# Patient Record
Sex: MALE | Race: WHITE
[De-identification: names, ages, dates, MRNs, and addresses within clinical notes are randomized per-mention and may not be internally consistent; named-entity substitution may affect disease eponyms.]

---

## 2018-11-21 ENCOUNTER — HOSPITAL ENCOUNTER (EMERGENCY)
Dept: HOSPITAL 72 - EMR | Age: 63
LOS: 1 days | Discharge: ANOTHER HOSPITAL | End: 2018-11-22
Payer: COMMERCIAL

## 2018-11-21 VITALS — SYSTOLIC BLOOD PRESSURE: 124 MMHG | DIASTOLIC BLOOD PRESSURE: 73 MMHG

## 2018-11-21 VITALS — WEIGHT: 125 LBS | BODY MASS INDEX: 18.51 KG/M2 | HEIGHT: 69 IN

## 2018-11-21 VITALS — SYSTOLIC BLOOD PRESSURE: 131 MMHG | DIASTOLIC BLOOD PRESSURE: 68 MMHG

## 2018-11-21 VITALS — SYSTOLIC BLOOD PRESSURE: 138 MMHG | DIASTOLIC BLOOD PRESSURE: 72 MMHG

## 2018-11-21 DIAGNOSIS — F17.200: ICD-10-CM

## 2018-11-21 DIAGNOSIS — K20.9: ICD-10-CM

## 2018-11-21 DIAGNOSIS — R07.9: Primary | ICD-10-CM

## 2018-11-21 LAB
ADD MANUAL DIFF: NO
ALBUMIN SERPL-MCNC: 2.7 G/DL (ref 3.4–5)
ALBUMIN/GLOB SERPL: 0.5 {RATIO} (ref 1–2.7)
ALP SERPL-CCNC: 145 U/L (ref 46–116)
ALT SERPL-CCNC: 28 U/L (ref 12–78)
ANION GAP SERPL CALC-SCNC: 9 MMOL/L (ref 5–15)
APPEARANCE UR: CLEAR
APTT PPP: (no result) S
AST SERPL-CCNC: 30 U/L (ref 15–37)
BASOPHILS NFR BLD AUTO: 1.9 % (ref 0–2)
BILIRUB SERPL-MCNC: 0.4 MG/DL (ref 0.2–1)
BUN SERPL-MCNC: 19 MG/DL (ref 7–18)
CALCIUM SERPL-MCNC: 9.3 MG/DL (ref 8.5–10.1)
CHLORIDE SERPL-SCNC: 98 MMOL/L (ref 98–107)
CK MB SERPL-MCNC: 4.3 NG/ML (ref 0–3.6)
CK SERPL-CCNC: 34 U/L (ref 26–308)
CO2 SERPL-SCNC: 25 MMOL/L (ref 21–32)
CREAT SERPL-MCNC: 0.9 MG/DL (ref 0.55–1.3)
EOSINOPHIL NFR BLD AUTO: 2 % (ref 0–3)
ERYTHROCYTE [DISTWIDTH] IN BLOOD BY AUTOMATED COUNT: 16.8 % (ref 11.6–14.8)
GLOBULIN SER-MCNC: 6 G/DL
GLUCOSE UR STRIP-MCNC: NEGATIVE MG/DL
HCT VFR BLD CALC: 26.8 % (ref 42–52)
HGB BLD-MCNC: 9 G/DL (ref 14.2–18)
KETONES UR QL STRIP: NEGATIVE
LEUKOCYTE ESTERASE UR QL STRIP: NEGATIVE
LYMPHOCYTES NFR BLD AUTO: 10.1 % (ref 20–45)
MCV RBC AUTO: 83 FL (ref 80–99)
MONOCYTES NFR BLD AUTO: 11.4 % (ref 1–10)
NEUTROPHILS NFR BLD AUTO: 74.6 % (ref 45–75)
NITRITE UR QL STRIP: NEGATIVE
PH UR STRIP: 7 [PH] (ref 4.5–8)
PLATELET # BLD: 203 K/UL (ref 150–450)
POTASSIUM SERPL-SCNC: 3.8 MMOL/L (ref 3.5–5.1)
PROT UR QL STRIP: NEGATIVE
RBC # BLD AUTO: 3.23 M/UL (ref 4.7–6.1)
SODIUM SERPL-SCNC: 132 MMOL/L (ref 136–145)
SP GR UR STRIP: 1.01 (ref 1–1.03)
UROBILINOGEN UR-MCNC: NORMAL MG/DL (ref 0–1)
WBC # BLD AUTO: 4.4 K/UL (ref 4.8–10.8)

## 2018-11-21 PROCEDURE — 71045 X-RAY EXAM CHEST 1 VIEW: CPT

## 2018-11-21 PROCEDURE — 96376 TX/PRO/DX INJ SAME DRUG ADON: CPT

## 2018-11-21 PROCEDURE — 82553 CREATINE MB FRACTION: CPT

## 2018-11-21 PROCEDURE — 96375 TX/PRO/DX INJ NEW DRUG ADDON: CPT

## 2018-11-21 PROCEDURE — 85730 THROMBOPLASTIN TIME PARTIAL: CPT

## 2018-11-21 PROCEDURE — 80053 COMPREHEN METABOLIC PANEL: CPT

## 2018-11-21 PROCEDURE — 85025 COMPLETE CBC W/AUTO DIFF WBC: CPT

## 2018-11-21 PROCEDURE — 36415 COLL VENOUS BLD VENIPUNCTURE: CPT

## 2018-11-21 PROCEDURE — 93005 ELECTROCARDIOGRAM TRACING: CPT

## 2018-11-21 PROCEDURE — 82550 ASSAY OF CK (CPK): CPT

## 2018-11-21 PROCEDURE — 84484 ASSAY OF TROPONIN QUANT: CPT

## 2018-11-21 PROCEDURE — 81003 URINALYSIS AUTO W/O SCOPE: CPT

## 2018-11-21 PROCEDURE — 96374 THER/PROPH/DIAG INJ IV PUSH: CPT

## 2018-11-21 PROCEDURE — 99284 EMERGENCY DEPT VISIT MOD MDM: CPT

## 2018-11-21 PROCEDURE — 71275 CT ANGIOGRAPHY CHEST: CPT

## 2018-11-21 NOTE — DIAGNOSTIC IMAGING REPORT
EXAM:

  XR Chest, 1 View

 

CLINICAL HISTORY:

  CP

 

TECHNIQUE:

  Frontal view of the chest.

 

COMPARISON:

  No relevant prior studies available.

 

FINDINGS:

  Lungs:  Bilateral pulmonary opacities.

  Pleural space:  Unremarkable.  No pneumothorax.

  Heart:  Unremarkable.  No cardiomegaly.

  Mediastinum:  Unremarkable.

  Bones/joints:  Probable old right rib fracture.

 

IMPRESSION:     

  Bilateral pulmonary opacities.  Could be from edema or pneumonia in the 

proper clinical setting. There's a broader differential.

## 2018-11-21 NOTE — DIAGNOSTIC IMAGING REPORT
EXAM:

  CT Chest With Intravenous Contrast

 

CLINICAL HISTORY:

  CP

 

TECHNIQUE:

  Axial computed tomography images of the chest with intravenous contrast.

  CTDI is 0.17 + 12.62 + 138.85 + 19.42 mGy and DLP is 810 mGy-cm.  One 

or more of the following dose reduction techniques were used: automated 

exposure control, adjustment of the mA and/or kV according to patient 

size, use of iterative reconstruction technique.

  MIP reconstructed images were created and reviewed.

 

COMPARISON:

  No relevant prior studies available.

 

FINDINGS:

  Lungs:  No central pulmonary embolus.  Pulmonary emphysema and fibrosis.

 Old bilateral pulmonary nodularity with some dominant nodules in the 

right lung.

  Pleural space:  Unremarkable.  No pneumothorax.  No effusion.

  Heart:  No cardiomegaly.  No pericardial effusion.

  Mediastinum:  Irregular thickening of the distal esophagus and GE 

junction with surrounding infiltration.  Debris in the mildly distended 

esophagus.

  Bones/joints:  Old rib fractures.  Chronic appearing compression 

deformity in the lower thoracic spine

  Soft tissues:  Unremarkable.

  Vasculature:  Unremarkable.  No thoracic aortic aneurysm.

  Lymph nodes:  No enlarged lymph nodes.

  Liver:  Nodular liver.

  Stomach and bowel:  Gastric distention.

  Intraperitoneal space:  Small amounts of fluid in the peritoneal cavity.

 

  Other findings:  Old granulomas disease.

 

IMPRESSION:     

1.  No central pulmonary embolus.

2.  Irregular thickening of the distal esophagus and GE junction with 

surrounding infiltration.  Differential includes inflammatory or 

neoplastic process.

## 2018-11-21 NOTE — EMERGENCY ROOM REPORT
History of Present Illness


General


Chief Complaint:  Chest Pain


Source:  Patient, EMS





Present Illness


HPI


This is 63-year-old male with a history esophageal cancer.  He is status post 

chemotherapy and radiation.  He presents with sternal chest pain ongoing for 

last 4 days.  Sharp in nature.  Worse with inspiration.  Better with rest.  No 

cough.  Subjective fever and chills.  Pain is 9 out of 10.  No relief with his 

morphine.  No cough.  No congestion.  No diaphoresis or exertional component.


Allergies:  


Coded Allergies:  


     No Known Allergies (Unverified , 11/21/18)





Patient History


Past Medical History:  see triage record, old chart reviewed


Past Surgical History:  other


Pertinent Family History:  none


Social History:  Reports: smoking


Immunizations:  other


Reviewed Nursing Documentation:  PMH: Agreed; PSxH: Agreed





Review of Systems


Eye:  Denies: eye pain, blurred vision


ENT:  Denies: ear pain, nose congestion, throat swelling


Respiratory:  Denies: cough, shortness of breath


Cardiovascular:  Reports: chest pain; Denies: palpitations


Gastrointestinal:  Denies: abdominal pain, diarrhea, nausea, vomiting


Musculoskeletal:  Denies: back pain, joint pain


Skin:  Denies: rash


Neurological:  Denies: headache, numbness


Endocrine:  Denies: increased thirst, increased urine


Hematologic/Lymphatic:  Denies: easy bruising


All Other Systems:  negative except mentioned in HPI





Physical Exam





Vital Signs








  Date Time  Temp Pulse Resp B/P (MAP) Pulse Ox O2 Delivery O2 Flow Rate FiO2


 


11/21/18 19:22 99.0 74 18 118/73 100 Room Air  





vitals unremarkable


Sp02 EP Interpretation:  reviewed, normal


General Appearance:  no apparent distress, alert, cachetic, Chronically Ill


Head:  normocephalic, atraumatic


Eyes:  bilateral eye PERRL, bilateral eye EOMI


ENT:  hearing grossly normal, normal pharynx


Neck:  full range of motion, supple, no meningismus


Respiratory:  chest non-tender, lungs clear, normal breath sounds, other - Pain 

with palpation of  sternum


Cardiovascular #1:  regular rate, rhythm, no murmur


Gastrointestinal:  normal bowel sounds, non tender, no mass, no organomegaly, 

no bruit, non-distended


Musculoskeletal:  back normal, gait/station normal, normal range of motion


Psychiatric:  mood/affect normal


Skin:  warm/dry





Medical Decision Making


Diagnostic Impression:  


 Primary Impression:  


 Chest pain


 Qualified Codes:  R07.9 - Chest pain, unspecified


 Additional Impression:  


 Esophagitis, acute


ER Course


Patient presents with chest pain.  This is more of an esophagitis.  Troponin 

negative.  EKG unremarkable.  Lungs show no evidence of any PE or infection.  

He does have fibrosis and emphysema.  There is irregular thickening of the 

distal esophagus with surrounding infiltration.  There is debris in the mildly 

distended esophagus.





Patient be admitted versus transfer.





I discussed the case with  who accepted pt for transfer to [facility].


Lab Results Impression


labs unremarkable


EKG Diagnostic Results


Rate:  normal


Rhythm:  NSR


ST Segments:  no acute changes





Rhythm Strip Diag. Results


Rhythm Strip Time:  19:31


EP Interpretation:  yes


Rate:  70


Rhythm:  NSR, no PVC's, no ectopy





Last Vital Signs








  Date Time  Temp Pulse Resp B/P (MAP) Pulse Ox O2 Delivery O2 Flow Rate FiO2


 


11/21/18 19:22 99.0 74 18 118/73 100 Room Air  








Status:  improved


[facility]


Condition:  Stable











Julio Holland MD Nov 21, 2018 19:31

## 2018-11-22 VITALS — SYSTOLIC BLOOD PRESSURE: 129 MMHG | DIASTOLIC BLOOD PRESSURE: 71 MMHG

## 2018-11-22 VITALS — DIASTOLIC BLOOD PRESSURE: 71 MMHG | SYSTOLIC BLOOD PRESSURE: 129 MMHG

## 2018-12-15 ENCOUNTER — HOSPITAL ENCOUNTER (EMERGENCY)
Dept: HOSPITAL 72 - EMR | Age: 63
LOS: 1 days | Discharge: ANOTHER HOSPITAL | End: 2018-12-16
Payer: COMMERCIAL

## 2018-12-15 VITALS — DIASTOLIC BLOOD PRESSURE: 74 MMHG | SYSTOLIC BLOOD PRESSURE: 139 MMHG

## 2018-12-15 VITALS — DIASTOLIC BLOOD PRESSURE: 60 MMHG | SYSTOLIC BLOOD PRESSURE: 130 MMHG

## 2018-12-15 VITALS — SYSTOLIC BLOOD PRESSURE: 117 MMHG | DIASTOLIC BLOOD PRESSURE: 79 MMHG

## 2018-12-15 VITALS — HEIGHT: 68 IN | BODY MASS INDEX: 19.7 KG/M2 | WEIGHT: 130 LBS

## 2018-12-15 DIAGNOSIS — I50.9: ICD-10-CM

## 2018-12-15 DIAGNOSIS — J44.9: ICD-10-CM

## 2018-12-15 DIAGNOSIS — G40.909: ICD-10-CM

## 2018-12-15 DIAGNOSIS — R18.8: ICD-10-CM

## 2018-12-15 DIAGNOSIS — J18.1: ICD-10-CM

## 2018-12-15 DIAGNOSIS — R10.84: Primary | ICD-10-CM

## 2018-12-15 DIAGNOSIS — K74.60: ICD-10-CM

## 2018-12-15 DIAGNOSIS — Z85.01: ICD-10-CM

## 2018-12-15 LAB
ADD MANUAL DIFF: YES
ALBUMIN SERPL-MCNC: 2.3 G/DL (ref 3.4–5)
ALBUMIN/GLOB SERPL: 0.4 {RATIO} (ref 1–2.7)
ALP SERPL-CCNC: 421 U/L (ref 46–116)
ALT SERPL-CCNC: 23 U/L (ref 12–78)
ANION GAP SERPL CALC-SCNC: 11 MMOL/L (ref 5–15)
APPEARANCE UR: CLEAR
APTT BLD: 29 SEC (ref 23–33)
APTT PPP: (no result) S
AST SERPL-CCNC: 45 U/L (ref 15–37)
BILIRUB SERPL-MCNC: 0.6 MG/DL (ref 0.2–1)
BUN SERPL-MCNC: 33 MG/DL (ref 7–18)
CALCIUM SERPL-MCNC: 9.8 MG/DL (ref 8.5–10.1)
CHLORIDE SERPL-SCNC: 101 MMOL/L (ref 98–107)
CK SERPL-CCNC: 12 U/L (ref 26–308)
CO2 SERPL-SCNC: 24 MMOL/L (ref 21–32)
CREAT SERPL-MCNC: 1.3 MG/DL (ref 0.55–1.3)
ERYTHROCYTE [DISTWIDTH] IN BLOOD BY AUTOMATED COUNT: 17.1 % (ref 11.6–14.8)
GLOBULIN SER-MCNC: 6.1 G/DL
GLUCOSE UR STRIP-MCNC: NEGATIVE MG/DL
HCT VFR BLD CALC: 27.9 % (ref 42–52)
HGB BLD-MCNC: 9 G/DL (ref 14.2–18)
INR PPP: 1.2 (ref 0.9–1.1)
KETONES UR QL STRIP: NEGATIVE
LEUKOCYTE ESTERASE UR QL STRIP: NEGATIVE
MCV RBC AUTO: 80 FL (ref 80–99)
NITRITE UR QL STRIP: NEGATIVE
PH UR STRIP: 5 [PH] (ref 4.5–8)
PLATELET # BLD: 241 K/UL (ref 150–450)
POTASSIUM SERPL-SCNC: 4 MMOL/L (ref 3.5–5.1)
PROT UR QL STRIP: (no result)
RBC # BLD AUTO: 3.49 M/UL (ref 4.7–6.1)
SODIUM SERPL-SCNC: 136 MMOL/L (ref 136–145)
SP GR UR STRIP: 1.01 (ref 1–1.03)
UROBILINOGEN UR-MCNC: NORMAL MG/DL (ref 0–1)
WBC # BLD AUTO: 9.6 K/UL (ref 4.8–10.8)

## 2018-12-15 PROCEDURE — 74177 CT ABD & PELVIS W/CONTRAST: CPT

## 2018-12-15 PROCEDURE — 87040 BLOOD CULTURE FOR BACTERIA: CPT

## 2018-12-15 PROCEDURE — 96365 THER/PROPH/DIAG IV INF INIT: CPT

## 2018-12-15 PROCEDURE — 96375 TX/PRO/DX INJ NEW DRUG ADDON: CPT

## 2018-12-15 PROCEDURE — 86850 RBC ANTIBODY SCREEN: CPT

## 2018-12-15 PROCEDURE — 86901 BLOOD TYPING SEROLOGIC RH(D): CPT

## 2018-12-15 PROCEDURE — 85610 PROTHROMBIN TIME: CPT

## 2018-12-15 PROCEDURE — 71045 X-RAY EXAM CHEST 1 VIEW: CPT

## 2018-12-15 PROCEDURE — 86900 BLOOD TYPING SEROLOGIC ABO: CPT

## 2018-12-15 PROCEDURE — 81003 URINALYSIS AUTO W/O SCOPE: CPT

## 2018-12-15 PROCEDURE — 85007 BL SMEAR W/DIFF WBC COUNT: CPT

## 2018-12-15 PROCEDURE — 36415 COLL VENOUS BLD VENIPUNCTURE: CPT

## 2018-12-15 PROCEDURE — 84484 ASSAY OF TROPONIN QUANT: CPT

## 2018-12-15 PROCEDURE — 96361 HYDRATE IV INFUSION ADD-ON: CPT

## 2018-12-15 PROCEDURE — 82550 ASSAY OF CK (CPK): CPT

## 2018-12-15 PROCEDURE — 83605 ASSAY OF LACTIC ACID: CPT

## 2018-12-15 PROCEDURE — 99285 EMERGENCY DEPT VISIT HI MDM: CPT

## 2018-12-15 PROCEDURE — 96376 TX/PRO/DX INJ SAME DRUG ADON: CPT

## 2018-12-15 PROCEDURE — 80053 COMPREHEN METABOLIC PANEL: CPT

## 2018-12-15 PROCEDURE — 85730 THROMBOPLASTIN TIME PARTIAL: CPT

## 2018-12-15 PROCEDURE — 85025 COMPLETE CBC W/AUTO DIFF WBC: CPT

## 2018-12-15 PROCEDURE — 96367 TX/PROPH/DG ADDL SEQ IV INF: CPT

## 2018-12-15 PROCEDURE — 83880 ASSAY OF NATRIURETIC PEPTIDE: CPT

## 2018-12-15 PROCEDURE — 93005 ELECTROCARDIOGRAM TRACING: CPT

## 2018-12-15 PROCEDURE — 83690 ASSAY OF LIPASE: CPT

## 2018-12-15 NOTE — EMERGENCY ROOM REPORT
History of Present Illness


General


Chief Complaint:  Abdominal Pain


Source:  Patient, EMS





Present Illness


HPI


Patient has 2 days of abdominal pain that's been worsening.  EMS was summoned.  

He has history of esophageal cancer, COPD and congestive heart failure.  Before 

EMS arrived is given Norco 5/325.  The patient states that the pain is still 

severe at this time.  He denies vomiting or diarrhea.  He's extremely 

constipated and also has not been passing any gas.  He denies any previous 

abdominal surgery.  He can't point with a finger the pain is diffuse and there 

is pressure and aching it's constant.  He denies any dysuria.  He rates the 

pain at 7/10, "severe" pressure and aching, constant and diffuse.  He states he'

s never had pain like this.  Denies dysuria.





He also has COPD and states he has been coughing.  He doesn't describe the 

phlegm.  He has HEBERT usually and this has not changed.  He denies orthopnea.





H/O seizures.  On medication and no seizures recently.





He denies chest pain, rashes, headache.


Allergies:  


Coded Allergies:  


     No Known Allergies (Unverified , 11/21/18)





Patient History


Past Medical History:  see triage record


Social History:  Denies: smoking - Prior


Social History Narrative


assisted living


Reviewed Nursing Documentation:  PMH: Agreed; PSxH: Agreed





Nursing Documentation-PMH


Hx Cardiac Problems:  Yes - CHF, SEPSIS


Hx COPD:  Yes


Hx Cancer:  Yes


Hx Neurological Problems:  Yes - seizures


Hx Seizures:  Yes





Review of Systems


All Other Systems:  negative except mentioned in HPI





Physical Exam





Vital Signs








  Date Time  Temp Pulse Resp B/P (MAP) Pulse Ox O2 Delivery O2 Flow Rate FiO2


 


12/15/18 19:10 97.5 90 16 130/60 97 Nasal Cannula 2.0 








Sp02 EP Interpretation:  reviewed, normal


General Appearance:  alert, GCS 15, non-toxic, mild distress, other - Eyes 

closed


Eyes:  bilateral eye other - eyes tight shut with pain


ENT:  moist mucus membranes


Neck:  full range of motion, supple


Respiratory:  crackles - Anteriorly, rales


Cardiovascular #1:  regular rate, rhythm, no edema


Cardiovascular #2:  2+ radial (L)


Gastrointestinal:  abnormal bowel sounds - slightly hyperactive, distended - 

but soft, guarding, rebound, tenderness - Diffuse


Genitourinary:  no CVA tenderness


Musculoskeletal:  digits/nails normal, normal range of motion, no calf 

tenderness


Neurologic:  alert, oriented x3, grossly normal


Psychiatric:  anxious - with pain


Skin:  other - sallo





Medical Decision Making


Diagnostic Impression:  


 Primary Impression:  


 Abdominal pain


 Qualified Codes:  R10.84 - Generalized abdominal pain


 Additional Impressions:  


 Pneumonia


 Qualified Codes:  J18.1 - Lobar pneumonia, unspecified organism


 Cirrhosis


 Qualified Codes:  K74.60 - Unspecified cirrhosis of liver; R18.8 - Other 

ascites


 Ascites


 Qualified Codes:  R18.8 - Other ascites


ER Course


Patient presents with severe abdominal pain is worsening over the last 2 days.  

Differential includes perforated viscus, diverticulitis, gastritis, peptic 

ulcer disease, pancreatitis amongst others.  Based on exam suspicion for a 

surgical pathology is high.  Patient will be evaluated with EKG, chest x-ray 

and CT the abdomen along with labs.  Patient will be treated with IV hydration, 

Zofran and morphine.





EKG with normal sinus rhythm rate 75 normal EKG.  Chest x-ray with bilateral 

interstitial marks consistent with interstitial edema or fibrosis.  Based on 

the x-ray the bolus is canceled but IV hydration will be continued.  CBC with 

normal WBC, anemia (same as 1 month ago).  CMP with renal insufficiency, min 

elevated glucose.  BNP mildly elevated.  Initial lactate 2.0.





CT abdomen with question of pneumonia.  Cirrhosis and ascites.





BC ordered and antibiotics.  Complex presentation. Consideration for SBP.





Improved pain.  Not surgical abdomen at this time however, consider surgical 

consultation.  Repeat lactate 1.7.





Presented to Dr. Brenner and accepted for transfer.





Repeat exam 23:05, soft but distended, no rebound, some guarding.





Laboratory Tests








Test


  12/15/18


19:40 12/15/18


21:35


 


White Blood Count


  9.6 K/UL


(4.8-10.8) 


 


 


Red Blood Count


  3.49 M/UL


(4.70-6.10)  L 


 


 


Hemoglobin


  9.0 G/DL


(14.2-18.0)  L 


 


 


Hematocrit


  27.9 %


(42.0-52.0)  L 


 


 


Mean Corpuscular Volume 80 FL (80-99)   


 


Mean Corpuscular Hemoglobin


  25.8 PG


(27.0-31.0)  L 


 


 


Mean Corpuscular Hemoglobin


Concent 32.3 G/DL


(32.0-36.0) 


 


 


Red Cell Distribution Width


  17.1 %


(11.6-14.8)  H 


 


 


Platelet Count


  241 K/UL


(150-450) 


 


 


Mean Platelet Volume


  7.0 FL


(6.5-10.1) 


 


 


Neutrophils (%) (Auto)


  % (45.0-75.0)


  


 


 


Lymphocytes (%) (Auto)


  % (20.0-45.0)


  


 


 


Monocytes (%) (Auto)  % (1.0-10.0)   


 


Eosinophils (%) (Auto)  % (0.0-3.0)   


 


Basophils (%) (Auto)  % (0.0-2.0)   


 


Differential Total Cells


Counted 100  


  


 


 


Neutrophils % (Manual) 91 % (45-75)  H 


 


Lymphocytes % (Manual) 4 % (20-45)  L 


 


Monocytes % (Manual) 3 % (1-10)   


 


Eosinophils % (Manual) 0 % (0-3)   


 


Basophils % (Manual) 0 % (0-2)   


 


Band Neutrophils 2 % (0-8)   


 


Platelet Estimate Adequate   


 


Platelet Morphology Normal   


 


Hypochromasia 1+   


 


Anisocytosis 1+   


 


Prothrombin Time


  12.1 SEC


(9.30-11.50)  H 


 


 


Prothrombin Time INR


  1.2 (0.9-1.1)


H 


 


 


PTT


  29 SEC (23-33)


  


 


 


Urine Color Pale yellow   


 


Urine Appearance Clear   


 


Urine pH 5 (4.5-8.0)   


 


Urine Specific Gravity


  1.015


(1.005-1.035) 


 


 


Urine Protein


  2+ (NEGATIVE)


H 


 


 


Urine Glucose (UA)


  Negative


(NEGATIVE) 


 


 


Urine Ketones


  Negative


(NEGATIVE) 


 


 


Urine Blood


  1+ (NEGATIVE)


H 


 


 


Urine Nitrite


  Negative


(NEGATIVE) 


 


 


Urine Bilirubin


  Negative


(NEGATIVE) 


 


 


Urine Urobilinogen


  Normal MG/DL


(0.0-1.0) 


 


 


Urine Leukocyte Esterase


  Negative


(NEGATIVE) 


 


 


Urine RBC


  2-4 /HPF (0 -


0)  H 


 


 


Urine WBC


  0-2 /HPF (0 -


0) 


 


 


Urine Squamous Epithelial


Cells None /LPF


(NONE/OCC) 


 


 


Urine Bacteria


  Few /HPF


(NONE) 


 


 


Sodium Level


  136 MMOL/L


(136-145) 


 


 


Potassium Level


  4.0 MMOL/L


(3.5-5.1) 


 


 


Chloride Level


  101 MMOL/L


() 


 


 


Carbon Dioxide Level


  24 MMOL/L


(21-32) 


 


 


Anion Gap


  11 mmol/L


(5-15) 


 


 


Blood Urea Nitrogen


  33 mg/dL


(7-18)  H 


 


 


Creatinine


  1.3 MG/DL


(0.55-1.30) 


 


 


Estimate Glomerular


Filtration Rate 55.8 mL/min


(>60) 


 


 


Glucose Level


  132 MG/DL


()  H 


 


 


Calcium Level


  9.8 MG/DL


(8.5-10.1) 


 


 


Total Bilirubin


  0.6 MG/DL


(0.2-1.0) 


 


 


Aspartate Amino Transferase


(AST) 45 U/L (15-37)


H 


 


 


Alanine Aminotransferase (ALT)


  23 U/L (12-78)


  


 


 


Alkaline Phosphatase


  421 U/L


()  H 


 


 


Total Creatine Kinase


  12 U/L


()  L 


 


 


Troponin I


  0.000 ng/mL


(0.000-0.056) 


 


 


Total Protein


  8.4 G/DL


(6.4-8.2)  H 


 


 


Albumin


  2.3 G/DL


(3.4-5.0)  L 


 


 


Globulin 6.1 g/dL   


 


Albumin/Globulin Ratio


  0.4 (1.0-2.7)


L 


 


 


Lipase


  70 U/L


()  L 


 


 


Lactic Acid Level


  


  2.00 mmol/L


(0.4-2.0)


 


Pro-B-Type Natriuretic Peptide


  


  1023 pg/mL


(0-125)  H








EKG Diagnostic Results


Rate:  normal


Rhythm:  NSR


ST Segments:  no acute changes





Rhythm Strip Diag. Results


EP Interpretation:  yes


Rhythm:  NSR, no PVC's, no ectopy





Chest X-Ray Diagnostic Results


Chest X-Ray Diagnostic Results :  


   Chest X-Ray Ordered:  Yes


   # of Views/Limited/Complete:  1 View


   Indication:  Other


   EP Interpretation:  Yes


   Interpretation:  no effusion, no pneumothorax, other - Bilateral 

interstitial  marks


   Impression:  Other


   Electronically Signed by:  Electronically signed by Tim Marrero MD





CT/MRI/US Diagnostic Results


CT/MRI/US Diagnostic Results :  


   Imaging Test Ordered:  abd pelvis


   Impression


Cirrhosis with moderate volume ascites.  Enlarged left kidney.  No obstruction.

  Infiltrate trait superimposed on lung base fibrosis probable infection and 

trace pleural effusions.





Last Vital Signs








  Date Time  Temp Pulse Resp B/P (MAP) Pulse Ox O2 Delivery O2 Flow Rate FiO2


 


12/15/18 23:53 97.5       


 


12/15/18 22:34  62 18 139/74 98 Nasal Cannula 4.0 








Status:  improved


Disposition:  [facility]


Condition:  Serious











Tim Marrero MD Dec 15, 2018 19:20

## 2018-12-15 NOTE — DIAGNOSTIC IMAGING REPORT
EXAM:

  XR Chest, 1 View

 

CLINICAL HISTORY:

  ABD PAIN

 

TECHNIQUE:

  Frontal view of the chest.

 

COMPARISON:

CT and radiograph of 11/21/18.

 

FINDINGS:

  Lungs: Interval increase in extensive reticular pulmonary opacities.  

It would be unusual for chronic interstitial lung disease to progress so 

rapidly.  Question superimposed infection.

  Pleural space:  Unremarkable.  No pneumothorax.

  Heart:  Unremarkable.  No cardiomegaly.

  Mediastinum:  Unremarkable.

  Bones/joints:  Unremarkable.

 

IMPRESSION:     

  Interval increase in extensive reticular pulmonary opacities.  It would 

be unusual for chronic interstitial lung disease to progress so rapidly.  

Question superimposed infection.

## 2018-12-15 NOTE — DIAGNOSTIC IMAGING REPORT
EXAM:

  CT Abdomen and Pelvis With Intravenous Contrast

 

CLINICAL HISTORY:

  ABD PAIN

 

TECHNIQUE:

  Axial computed tomography images of the abdomen and pelvis with 

intravenous contrast.  CTDI is 13.14 mGy and DLP is 619 mGy-cm.  One or 

more of the following dose reduction techniques were used: automated 

exposure control, adjustment of the mA and/or kV according to patient 

size, use of iterative reconstruction technique.

 

COMPARISON:

  Chest CT of 11/21/18

 

FINDINGS:

Redemonstrated basilar fibrosis.  Component of airspace disease, 

particularly in the right middle lobe and lingula may reflect 

superimposed infection.  Trace pleural effusions.

 

Cirrhotic liver morphology.  Moderate volume ascites.  This generalized 

fluid limits sensitivity for an acute inflammatory process in the abdomen 

or pelvis.  There is no bowel obstruction or perforation.  No 

hydronephrosis.  The left kidney is somewhat swollen compared to the 

right.  Correlate for any signs or symptoms of pyelonephritis.  

 

Small hiatal hernia with distal esophageal wall thickening.  Ascitic 

fluid distends a indirect left inguinal hernia.  Aortoiliac 

atherosclerosis without aneurysm.  No acute fracture.  Spinal process 

fusion device at L5-S1.  Lumbar dextrocurvature.  No acute fracture.

 

IMPRESSION:     

Cirrhosis with moderate volume ascites.

 

Left kidney is enlarged compared to the right.  Correlate for signs or 

symptoms of pyelonephritis.  

 

No urinary or GI tract obstruction.

 

Infiltrate is superimposed on lung base fibrosis.  Consider infection.

 

Trace pleural effusions.

## 2018-12-16 VITALS — DIASTOLIC BLOOD PRESSURE: 74 MMHG | SYSTOLIC BLOOD PRESSURE: 139 MMHG
